# Patient Record
Sex: FEMALE | Race: NATIVE HAWAIIAN OR OTHER PACIFIC ISLANDER | NOT HISPANIC OR LATINO
[De-identification: names, ages, dates, MRNs, and addresses within clinical notes are randomized per-mention and may not be internally consistent; named-entity substitution may affect disease eponyms.]

---

## 2020-04-14 PROBLEM — Z00.00 ENCOUNTER FOR PREVENTIVE HEALTH EXAMINATION: Status: ACTIVE | Noted: 2020-04-14

## 2020-04-17 ENCOUNTER — TRANSCRIPTION ENCOUNTER (OUTPATIENT)
Age: 25
End: 2020-04-17

## 2020-04-21 ENCOUNTER — APPOINTMENT (OUTPATIENT)
Dept: OTOLARYNGOLOGY | Facility: AMBULATORY SURGERY CENTER | Age: 25
End: 2020-04-21

## 2020-05-05 ENCOUNTER — APPOINTMENT (OUTPATIENT)
Dept: OTOLARYNGOLOGY | Facility: CLINIC | Age: 25
End: 2020-05-05

## 2020-05-05 ENCOUNTER — EMERGENCY (EMERGENCY)
Facility: HOSPITAL | Age: 25
LOS: 1 days | Discharge: HOME | End: 2020-05-05
Attending: EMERGENCY MEDICINE | Admitting: EMERGENCY MEDICINE
Payer: MEDICAID

## 2020-05-05 VITALS
SYSTOLIC BLOOD PRESSURE: 146 MMHG | WEIGHT: 156.09 LBS | TEMPERATURE: 98 F | RESPIRATION RATE: 18 BRPM | HEART RATE: 89 BPM | HEIGHT: 62 IN | OXYGEN SATURATION: 99 % | DIASTOLIC BLOOD PRESSURE: 97 MMHG

## 2020-05-05 LAB — SARS-COV-2 RNA SPEC QL NAA+PROBE: SIGNIFICANT CHANGE UP

## 2020-05-05 PROCEDURE — 87635 SARS-COV-2 COVID-19 AMP PRB: CPT

## 2020-05-05 PROCEDURE — 99284 EMERGENCY DEPT VISIT MOD MDM: CPT | Mod: 25

## 2020-05-05 PROCEDURE — 99284 EMERGENCY DEPT VISIT MOD MDM: CPT

## 2020-05-05 RX ORDER — SODIUM CHLORIDE 0.65 %
1 AEROSOL, SPRAY (ML) NASAL
Qty: 28 | Refills: 0
Start: 2020-05-05 | End: 2020-05-18

## 2020-05-05 RX ORDER — FLUTICASONE PROPIONATE 50 MCG
1 SPRAY, SUSPENSION NASAL
Qty: 1 | Refills: 0
Start: 2020-05-05 | End: 2020-05-18

## 2020-05-05 NOTE — ED PROVIDER NOTE - NSFOLLOWUPINSTRUCTIONS_ED_ALL_ED_FT
USE VINNIE MED NASAL RINSES TWICE A DAY  USE FLONASE   FOLLOW UP WITH YOUR ENT DOCTOR  USE HUMIDIFIER AT HOME  RETURN FOR ANY WORSENING SYMPTOMS

## 2020-05-05 NOTE — ED PROVIDER NOTE - ATTENDING CONTRIBUTION TO CARE
congestion and loss of smell post covid.  likely viral etiology.  seen by ent.  home with symptomatic treatment

## 2020-05-05 NOTE — ED PROVIDER NOTE - OBJECTIVE STATEMENT
The pt is a 26 y/o F, who was sent to ED by dr roman for covid19 test and ent consult - he's currently not seeing pts in office, sent her "to get scoped". Pt tested +covid 4/7, states nasal congestion and loss of smell x 1 mon w/o any acute changes in symptoms. States that used OTC meds but does not recall name w/o relief. Denies fevers, chills, cp, sob, cough, n/v/d, abd pain

## 2020-05-05 NOTE — ED ADULT TRIAGE NOTE - CHIEF COMPLAINT QUOTE
"I was told by MD Gio Ramirez to come to ER to be retested for COVID and to have an ENT consult because I am having serve congestion and I he does not know if it is due to covid". Pt has swab done April 7 that was positive.

## 2020-05-05 NOTE — ED ADULT NURSE NOTE - OBJECTIVE STATEMENT
Pt presents to ER with c/o increased congestion, back pain/body aches, loss of smell/appetite, diarrhea d/t +COVID result on April 3. Pt denies SOB, fevers, chills, difficulty breathing, cough. Pt states "My mother looked in my nose because of all the congestion I'm having and saw a growth." Pt reports congestion is often unilateral but sometimes is bilateral.

## 2020-05-05 NOTE — CONSULT NOTE ADULT - SUBJECTIVE AND OBJECTIVE BOX
ENT Consult Note     HPI: 25y Female presenting to the ED with congestion and anosmia x 4 weeks. Patient tested positive for COVID on April 7. Denies epistaxis.  Normally she does not have congestion. Reports that she feels like one side gets congested and then the other side. Tried using OTC oral med for decongestion without relief. Tried a nasal spray for 2 days without relief.     Allergies: No Known Allergies    Vital Signs Last 24 Hrs  T(C): 36.8 (05 May 2020 15:45), Max: 36.8 (05 May 2020 15:45)  T(F): 98.2 (05 May 2020 15:45), Max: 98.2 (05 May 2020 15:45)  HR: 89 (05 May 2020 15:45) (89 - 89)  BP: 146/97 (05 May 2020 15:45) (146/97 - 146/97)  BP(mean): --  RR: 18 (05 May 2020 15:45) (18 - 18)  SpO2: 99% (05 May 2020 15:45) (99% - 99%)    PHYSICAL EXAM:  Constitutional: Well-developed, well-nourished.  No hoarseness.     Head:  normocephalic, atraumatic.   Nose:  Septum intact, deviated to the R.  Inferior turbinates erythematous and mildly edematous. otherwise no abnormalities/masses seen on ant microscopy  OC/OP:  no tonsils. Floor of mouth, buccal mucosa, lips, hard palate, soft palate, uvula, posterior pharyngeal wall normal.  Mucosa moist.    A/P:  25y Female COVID+ presenting with congestion/anosmia x4 weeks likely secondary to viral illness.   -Neilmed rinses twice a day  -Flonase once daily   -Follow up with Dr. Ramirez if COVID swab negative.     Thank you for the consult, please page ENT at [phone] with any questions/concerns.  -------------------------------------------------  Cindy Pierce MD  Department of Otolaryngology - Head and Neck Surgery  [facility]

## 2020-05-05 NOTE — ED PROVIDER NOTE - CLINICAL SUMMARY MEDICAL DECISION MAKING FREE TEXT BOX
pt sent to ed by her ent for covid test and for resident eval - c/o nasal congestion and loss of smell x mon w/o any acute changes today, had a +covid a mon ago, well appearing, non toxic, covid sent, seen by ent - flonase rx given, to  neilmed kit OTC from any pharm, pt understands and agrees w/plan

## 2020-05-05 NOTE — ED PROVIDER NOTE - CARE PROVIDERS DIRECT ADDRESSES
[email or web address]

## 2020-05-05 NOTE — ED PROVIDER NOTE - ENMT, MLM
Airway patent, Nasal congestion, + boggy mucosa, no frontal or maxillary sinus tend b/l. Mouth with normal mucosa. Throat has no vesicles, no oropharyngeal exudates and uvula is midline. Ears: + cerumen b/l, no AOM or AOE b/l, + cervical lymphadenopathy b/l

## 2020-05-05 NOTE — ED PROVIDER NOTE - CARE PROVIDER_API CALL
Gio Ramirez)  Otolaryngology  [address] [place] [address]  Phone: [phone]  Fax: [phone]  Follow Up Time:

## 2020-05-05 NOTE — ED PROVIDER NOTE - PATIENT PORTAL LINK FT
You can access the FollowMyHealth Patient Portal offered by [facility] by registering at the following website: [email or web address]. By joining [employer]’s FollowMyHealth portal, you will also be able to view your health information using other applications (apps) compatible with our system.

## 2020-05-06 LAB — SARS-COV-2 RNA SPEC QL NAA+PROBE: DETECTED

## 2021-08-25 ENCOUNTER — TRANSCRIPTION ENCOUNTER (OUTPATIENT)
Age: 26
End: 2021-08-25

## 2021-08-26 ENCOUNTER — RESULT REVIEW (OUTPATIENT)
Age: 26
End: 2021-08-26

## 2021-08-26 ENCOUNTER — OUTPATIENT (OUTPATIENT)
Dept: OUTPATIENT SERVICES | Facility: HOSPITAL | Age: 26
LOS: 1 days | Discharge: HOME | End: 2021-08-26
Payer: MEDICAID

## 2021-08-26 PROCEDURE — 88305 TISSUE EXAM BY PATHOLOGIST: CPT | Mod: 26

## 2021-08-26 PROCEDURE — 88311 DECALCIFY TISSUE: CPT | Mod: 26

## 2021-08-26 PROCEDURE — 88300 SURGICAL PATH GROSS: CPT | Mod: 26,59

## 2021-09-05 LAB — SURGICAL PATHOLOGY STUDY: SIGNIFICANT CHANGE UP

## 2022-01-11 NOTE — ED ADULT TRIAGE NOTE - BP NONINVASIVE SYSTOLIC (MM HG)
146 Render Risk Assessment In Note?: no Additional Notes: Monitor not susp today only felt with lateral pressure Detail Level: Simple